# Patient Record
Sex: MALE | Race: WHITE | NOT HISPANIC OR LATINO | URBAN - METROPOLITAN AREA
[De-identification: names, ages, dates, MRNs, and addresses within clinical notes are randomized per-mention and may not be internally consistent; named-entity substitution may affect disease eponyms.]

---

## 2019-05-28 ENCOUNTER — OFFICE VISIT - HEALTHEAST (OUTPATIENT)
Dept: FAMILY MEDICINE | Facility: CLINIC | Age: 44
End: 2019-05-28

## 2019-05-28 ENCOUNTER — COMMUNICATION - HEALTHEAST (OUTPATIENT)
Dept: TELEHEALTH | Facility: CLINIC | Age: 44
End: 2019-05-28

## 2019-05-28 DIAGNOSIS — R03.0 ELEVATED BLOOD PRESSURE READING WITHOUT DIAGNOSIS OF HYPERTENSION: ICD-10-CM

## 2019-05-28 DIAGNOSIS — D23.9 DERMATOFIBROMA: ICD-10-CM

## 2019-05-28 DIAGNOSIS — Z00.00 ROUTINE GENERAL MEDICAL EXAMINATION AT A HEALTH CARE FACILITY: ICD-10-CM

## 2019-05-28 LAB
CHOLEST SERPL-MCNC: 242 MG/DL
FASTING STATUS PATIENT QL REPORTED: YES
FASTING STATUS PATIENT QL REPORTED: YES
GLUCOSE BLD-MCNC: 217 MG/DL (ref 70–125)
HDLC SERPL-MCNC: 42 MG/DL
LDLC SERPL CALC-MCNC: 133 MG/DL
TRIGL SERPL-MCNC: 337 MG/DL

## 2019-05-28 ASSESSMENT — MIFFLIN-ST. JEOR: SCORE: 2224.36

## 2019-05-30 ENCOUNTER — COMMUNICATION - HEALTHEAST (OUTPATIENT)
Dept: FAMILY MEDICINE | Facility: CLINIC | Age: 44
End: 2019-05-30

## 2019-06-14 ENCOUNTER — OFFICE VISIT - HEALTHEAST (OUTPATIENT)
Dept: FAMILY MEDICINE | Facility: CLINIC | Age: 44
End: 2019-06-14

## 2019-06-14 DIAGNOSIS — E78.5 DYSLIPIDEMIA: ICD-10-CM

## 2019-06-14 DIAGNOSIS — E11.9 TYPE 2 DIABETES MELLITUS WITHOUT COMPLICATION, WITHOUT LONG-TERM CURRENT USE OF INSULIN (H): ICD-10-CM

## 2019-06-14 DIAGNOSIS — R03.0 ELEVATED BLOOD PRESSURE READING WITHOUT DIAGNOSIS OF HYPERTENSION: ICD-10-CM

## 2019-06-14 LAB
FASTING STATUS PATIENT QL REPORTED: YES
GLUCOSE BLD-MCNC: 149 MG/DL (ref 70–125)
HBA1C MFR BLD: 10.3 % (ref 3.5–6)

## 2019-06-14 ASSESSMENT — MIFFLIN-ST. JEOR: SCORE: 2201.68

## 2019-07-16 ENCOUNTER — AMBULATORY - HEALTHEAST (OUTPATIENT)
Dept: EDUCATION SERVICES | Facility: CLINIC | Age: 44
End: 2019-07-16

## 2019-11-01 ENCOUNTER — COMMUNICATION - HEALTHEAST (OUTPATIENT)
Dept: FAMILY MEDICINE | Facility: CLINIC | Age: 44
End: 2019-11-01

## 2019-11-01 DIAGNOSIS — E11.9 TYPE 2 DIABETES MELLITUS WITHOUT COMPLICATION, WITHOUT LONG-TERM CURRENT USE OF INSULIN (H): ICD-10-CM

## 2019-11-15 ENCOUNTER — OFFICE VISIT - HEALTHEAST (OUTPATIENT)
Dept: FAMILY MEDICINE | Facility: CLINIC | Age: 44
End: 2019-11-15

## 2019-11-15 DIAGNOSIS — E11.9 TYPE 2 DIABETES MELLITUS WITHOUT COMPLICATION, WITHOUT LONG-TERM CURRENT USE OF INSULIN (H): ICD-10-CM

## 2019-11-15 LAB
ANION GAP SERPL CALCULATED.3IONS-SCNC: 10 MMOL/L (ref 5–18)
BUN SERPL-MCNC: 15 MG/DL (ref 8–22)
CALCIUM SERPL-MCNC: 9.7 MG/DL (ref 8.5–10.5)
CHLORIDE BLD-SCNC: 104 MMOL/L (ref 98–107)
CHOLEST SERPL-MCNC: 198 MG/DL
CO2 SERPL-SCNC: 26 MMOL/L (ref 22–31)
CREAT SERPL-MCNC: 1.11 MG/DL (ref 0.7–1.3)
FASTING STATUS PATIENT QL REPORTED: YES
GFR SERPL CREATININE-BSD FRML MDRD: >60 ML/MIN/1.73M2
GLUCOSE BLD-MCNC: 132 MG/DL (ref 70–125)
HBA1C MFR BLD: 6.6 % (ref 3.5–6)
HDLC SERPL-MCNC: 37 MG/DL
LDLC SERPL CALC-MCNC: 133 MG/DL
POTASSIUM BLD-SCNC: 4.5 MMOL/L (ref 3.5–5)
SODIUM SERPL-SCNC: 140 MMOL/L (ref 136–145)
TRIGL SERPL-MCNC: 139 MG/DL

## 2019-11-15 RX ORDER — GLUCOSAMINE HCL/CHONDROITIN SU 500-400 MG
CAPSULE ORAL
Qty: 100 STRIP | Refills: 3 | Status: SHIPPED | OUTPATIENT
Start: 2019-11-15

## 2019-11-17 ENCOUNTER — COMMUNICATION - HEALTHEAST (OUTPATIENT)
Dept: FAMILY MEDICINE | Facility: CLINIC | Age: 44
End: 2019-11-17

## 2020-04-11 ENCOUNTER — COMMUNICATION - HEALTHEAST (OUTPATIENT)
Dept: FAMILY MEDICINE | Facility: CLINIC | Age: 45
End: 2020-04-11

## 2020-04-11 DIAGNOSIS — E11.9 TYPE 2 DIABETES MELLITUS WITHOUT COMPLICATION, WITHOUT LONG-TERM CURRENT USE OF INSULIN (H): ICD-10-CM

## 2020-07-17 ENCOUNTER — COMMUNICATION - HEALTHEAST (OUTPATIENT)
Dept: FAMILY MEDICINE | Facility: CLINIC | Age: 45
End: 2020-07-17

## 2020-07-17 DIAGNOSIS — E11.9 TYPE 2 DIABETES MELLITUS WITHOUT COMPLICATION, WITHOUT LONG-TERM CURRENT USE OF INSULIN (H): ICD-10-CM

## 2020-10-05 ENCOUNTER — COMMUNICATION - HEALTHEAST (OUTPATIENT)
Dept: FAMILY MEDICINE | Facility: CLINIC | Age: 45
End: 2020-10-05

## 2020-10-05 DIAGNOSIS — E11.9 TYPE 2 DIABETES MELLITUS WITHOUT COMPLICATION, WITHOUT LONG-TERM CURRENT USE OF INSULIN (H): ICD-10-CM

## 2020-12-29 ENCOUNTER — COMMUNICATION - HEALTHEAST (OUTPATIENT)
Dept: FAMILY MEDICINE | Facility: CLINIC | Age: 45
End: 2020-12-29

## 2020-12-29 DIAGNOSIS — E11.9 TYPE 2 DIABETES MELLITUS WITHOUT COMPLICATION, WITHOUT LONG-TERM CURRENT USE OF INSULIN (H): ICD-10-CM

## 2021-02-05 ENCOUNTER — RECORDS - HEALTHEAST (OUTPATIENT)
Dept: ADMINISTRATIVE | Facility: OTHER | Age: 46
End: 2021-02-05

## 2021-02-05 LAB — RETINOPATHY: NEGATIVE

## 2021-02-11 ENCOUNTER — RECORDS - HEALTHEAST (OUTPATIENT)
Dept: HEALTH INFORMATION MANAGEMENT | Facility: CLINIC | Age: 46
End: 2021-02-11

## 2021-03-24 ENCOUNTER — COMMUNICATION - HEALTHEAST (OUTPATIENT)
Dept: FAMILY MEDICINE | Facility: CLINIC | Age: 46
End: 2021-03-24

## 2021-03-24 DIAGNOSIS — E11.9 TYPE 2 DIABETES MELLITUS WITHOUT COMPLICATION, WITHOUT LONG-TERM CURRENT USE OF INSULIN (H): ICD-10-CM

## 2021-03-31 ENCOUNTER — AMBULATORY - HEALTHEAST (OUTPATIENT)
Dept: NURSING | Facility: CLINIC | Age: 46
End: 2021-03-31

## 2021-04-21 ENCOUNTER — AMBULATORY - HEALTHEAST (OUTPATIENT)
Dept: NURSING | Facility: CLINIC | Age: 46
End: 2021-04-21

## 2021-05-01 ENCOUNTER — HEALTH MAINTENANCE LETTER (OUTPATIENT)
Age: 46
End: 2021-05-01

## 2021-05-29 NOTE — TELEPHONE ENCOUNTER
Reason contacted:  Results   Information relayed:  Below message. Appointment scheduled on Friday 6/14/2019 at 11:20 am with Dr. Del Rio.   Additional questions:  No  Further follow-up needed:  No  Okay to leave a detailed message:  No

## 2021-05-29 NOTE — TELEPHONE ENCOUNTER
----- Message from Jem Del Rio MD sent at 5/30/2019  2:43 PM CDT -----  Please call patient: Cholesterol numbers are mildly elevated.  Blood sugar was very high.  Blood sugar suggests possible diabetes.  Normal blood sugar should be less than 100 when fasting, blood sugar was 217.  At this time try to eliminate sugar from your diet.  Reduce carbohydrate portions including bread rice pasta and cereal.  Schedule a follow-up visit with me sooner than, 1 month originally discussed to recheck her blood pressure and to check blood sugar and decide on further steps.  I recommended appointment in 1 or 2 weeks.

## 2021-05-29 NOTE — PROGRESS NOTES
Patient ID: Henok Waldron is a 44 y.o. male.  /82   Pulse 65   Ht 6' (1.829 m)   Wt (!) 283 lb (128.4 kg)   SpO2 98%   BMI 38.38 kg/m      Assessment/Plan:                Diagnoses and all orders for this visit:    Type 2 diabetes mellitus without complication, without long-term current use of insulin (H)  -     Glycosylated Hemoglobin A1c  -     Glucose  -     metFORMIN (GLUCOPHAGE) 500 MG tablet; Take 0.5 tablets (250 mg total) by mouth 2 (two) times a day with meals.  Dispense: 45 tablet; Refill: 2  -     Ambulatory referral to Diabetes Education Program (CDE)    Elevated blood pressure reading without diagnosis of hypertension    Dyslipidemia      DISCUSSION  Initiate metformin.  See diabetic nurse educator.  Return to see me in the next 6 to 8 weeks to reassess and decide on further course of action.  Anticipate further testing such as urine microalbumin, eye examination and foot exam at that visit.  We will also give consideration to starting statin therapy pending his tolerance of the Metformin which we will start at this time.  Will start low-dose metformin and consider adjustment  Subjective:     HPI    Henok Waldron is a 44 y.o. male is here today to follow-up on abnormal blood sugar testing obtained at his physical exam in May 28.  Weight and blood pressure were brought up his concerns at his physical.  We discussed an approach to monitoring his blood pressure into beginning a diet improvement and exercise as a means to help reduce weight and improve overall health.  Unfortunately he had a fasting blood sugar of 217.  Patient was contacted and the results were explained.  Patient was given advice regarding further significant dietary change, namely elimination of sugar and reduction of carbohydrates.  Patient has done some research on his own and had made great changes.  His weight is noted to be down 5 pounds.  He reports overall feeling much better.  On his arrival today fasting blood  sugars obtained of 149.  Discussed that this represents an improvement compared to the 217 but is still not an ideal fasting blood sugar.  We also obtained an A1c today which is 10.3.  I discussed with him extensively that his numbers do substantiate the diagnosis of type 2 diabetes.  Based on all factors I do feel it is imperative that we start him on medication therapy in addition to continuing the dietary changes which he is started.  I also discussed with him the continued importance of close monitoring of blood pressure.  His blood pressure does remain elevated above the ideal range.  We will likely need to consider addition of antihypertensive therapy.  His cholesterol results were reasonable from an average standpoint but because of his diabetes diagnosis it is a more significant consideration and will likely warrant the use of statin medication at some point.  We discussed diabetes in basic terms and the importance of control to prevent complications.  The basics of complications are discussed today.  We discussed that we would get him set up to see a diabetic nurse educator to further his knowledge and to allow him to begin checking his blood sugars.  We discussed specifically initiating metformin.    Review of Systems  Complete review of systems is obtained.  Other than the specific considerations noted above complete review of systems is negative.      Objective:   Medications:  Current Outpatient Medications   Medication Sig     metFORMIN (GLUCOPHAGE) 500 MG tablet Take 0.5 tablets (250 mg total) by mouth 2 (two) times a day with meals.     Allergies:  No Known Allergies    Tobacco:   reports that he has never smoked. He quit smokeless tobacco use about 5 months ago.     Physical Exam      /82   Pulse 65   Ht 6' (1.829 m)   Wt (!) 283 lb (128.4 kg)   SpO2 98%   BMI 38.38 kg/m          General Appearance:    Alert, cooperative, no distress

## 2021-05-29 NOTE — PROGRESS NOTES
Patient ID: Henok Waldron is a 44 y.o. male.  /88   Pulse 87   Ht 6' (1.829 m)   Wt (!) 288 lb (130.6 kg)   SpO2 97%   BMI 39.06 kg/m      Assessment/Plan:                   Diagnoses and all orders for this visit:    Routine general medical examination at a health care facility  -     Lipid [facility] FASTING  -     Glucose    Dermatofibroma  -     triamcinolone (KENALOG) 0.1 % cream; Apply topically 2 (two) times a day for 10 days.  Dispense: 30 g; Refill: 0    BMI 39.0-39.9,adult    Elevated blood pressure reading without diagnosis of hypertension    Other orders  -     Tdap vaccine,  6yo or older,  IM  -     Cancel: Td, Preservative Free (green label)      DISCUSSION  The soft tissue lump is a consistency of a dermatofibroma, there is some vascularity bringing to question as to whether this may be some sort of hemangioma.  In any regard think it is benign.  Since it is that she will try short course of triamcinolone low potency to see if this helps shrink the lesion and/or treat the itching.    Lifestyle modification to improve blood pressure and reduce weight discussed extensively.  Follow-up in 1 month to reassess blood pressure.  Patient does admit to snoring but does not have out right somnolence or witnessed apnea to warrant any specific referral at this point but will reassess.    Obtain labs as ordered above.  Update tetanus shot.  Subjective:     HPI    Henok Waldron is a 44 y.o. male who is here as a new patient.  His primary concern is that he has a lump on his arm that arose about a month ago.  He thinks it may have been an insect bite but has since been a persistent lump that is slightly purplish in color slightly raised and sometimes itchy.  He otherwise overall feels well.  He has not had health care visit in approximately 15 years and inquires about routine health prevention measures.    Blood pressure is noted to be elevated.  It improved slightly upon recheck.  Possible underlying  hypertension however based on one isolated timeframe with elevated reading feel that we need more monitoring in order to establish diagnosis of hypertension and consider treatment.  Discussed lifestyle modification as the initial step along with arranging for short-term follow-up to reassess blood pressure.    Body mass index is elevated at 39.  Reviewed extensively nutrition and exercise as a means to lower his weight and reduce the risk of health complications.  Discussed screening for cholesterol and blood sugar as he is fasting today.  No indication for colon cancer prostate cancer screening based on family history.    Review of Systems  Complete review of systems is obtained.  Other than the specific considerations noted above complete review of systems is negative.          Objective:   Medications:  Current Outpatient Medications   Medication Sig     triamcinolone (KENALOG) 0.1 % cream Apply topically 2 (two) times a day for 10 days.       Allergies:  No Known Allergies    Tobacco:   reports that he has never smoked. He quit smokeless tobacco use about 4 months ago.     Physical Exam          /88   Pulse 87   Ht 6' (1.829 m)   Wt (!) 288 lb (130.6 kg)   SpO2 97%   BMI 39.06 kg/m          General Appearance:    Alert, cooperative, no distress   Eyes:   No scleral icterus or conjunctival irritation       Ears:    Normal TM's and external ear canals, both ears   Throat:   Lips, mucosa, and tongue normal; teeth and gums normal   Neck:   Supple, symmetrical, trachea midline, no adenopathy;        thyroid:  No enlargement/tenderness/nodules   Lungs:     Clear to auscultation bilaterally, respirations unlabored, no wheezes or crackles   Heart:    Regular rate and rhythm,  No murmur   Extremities:  No edema, no joint swelling or redness, no evidence of any injuries   Skin:  In the left upper inner arm there is a small raised papule measuring about 0.75 mm in greatest diameter, it is skin colored with a  slight purplish hue suggestive of underlying vascularity, has a somewhat firm and feels suggestive of a dermatofibroma.  This is clearly within the skin itself.  It is nontender to the touch.   Neurologic:  On gross examination there is no motor or sensory deficit.  Patient walks with a normal gait

## 2021-05-30 NOTE — PROGRESS NOTES
First in 15 years  Wife made come  Spot on arm  Started metformin  Some issues early on  Better now  1/2 tablet two times a day  Morning before work  Evening after dinner  Getting easier to remember  Not checking yet  OneTouch    Lots of vegetables and chicken  Had a hamburger the other day  Potatoes, rice, white bread, pasta    B-skips most of the time-mixed fruit from work, melon and berries, egg burrito  L-bring salads, out-chipolte bowl, tacos with black beans  D-BLT, salads with egg or chicken, roasted veggies  S-protein bars, popcorn, low carb rachel seed chips, peppers no dip  More salty, not sweet    Walking about 2 miles a day most days

## 2021-06-02 NOTE — TELEPHONE ENCOUNTER
RN cannot approve Refill Request    RN can NOT refill this medication Protocol failed and NO refill given. Last office visit: 6/14/2019 Jem Del Rio MD Last Physical: Visit date not found Last MTM visit: Visit date not found Last visit same specialty: 6/14/2019 Jem Del Rio MD.  Next visit within 3 mo: Visit date not found  Next physical within 3 mo: Visit date not found      Constance Grossman, [facility] Triage/Med Refill 11/1/2019    Requested Prescriptions   Pending Prescriptions Disp Refills     metFORMIN (GLUCOPHAGE) 500 MG tablet [Pharmacy Med Name: METFORMIN 500MG TAB] 90 tablet 1     Sig: TAKE 1/2 (ONE-HALF) TABLET BY MOUTH TWICE DAILY WITH MEALS       Metformin Refill Protocol Failed - 11/1/2019  3:05 PM        Failed - LFT or AST or ALT in last 12 months     No results found for: ALBUMIN, BILITOT, BILIDIR, ALKPHOS, AST, ALT, PROT             Failed - GFR or Serum Creatinine in last 6 months     No results found for: GFRNONAA  No results found for: GFRAA          Failed - Microalbumin in last year      No results found for: MICROALBUR               Passed - Blood pressure in last 12 months     BP Readings from Last 1 Encounters:   06/14/19 140/82             Passed - Visit with PCP or prescribing provider visit in last 6 months or next 3 months     Last office visit with prescriber/PCP: 6/14/2019 OR same dept: 6/14/2019 Jem Del Rio MD OR same specialty: 6/14/2019 Jem Del Rio MD Last physical: Visit date not found Last MTM visit: Visit date not found         Next appt within 3 mo: Visit date not found  Next physical within 3 mo: Visit date not found  Prescriber OR PCP: Jem Del Rio MD  Last diagnosis associated with med order: 1. Type 2 diabetes mellitus without complication, without long-term current use of insulin (H)  - metFORMIN (GLUCOPHAGE) 500 MG tablet [Pharmacy Med Name: METFORMIN 500MG TAB]; TAKE 1/2 (ONE-HALF) TABLET BY MOUTH TWICE DAILY WITH MEALS  Dispense: 90  tablet; Refill: 1     If protocol passes may refill for 12 months if within 3 months of last provider visit (or a total of 15 months).           Passed - A1C in last 6 months     Hemoglobin A1c   Date Value Ref Range Status   06/14/2019 10.3 (H) 3.5 - 6.0 % Final

## 2021-06-03 VITALS
DIASTOLIC BLOOD PRESSURE: 72 MMHG | BODY MASS INDEX: 34.86 KG/M2 | WEIGHT: 257 LBS | HEART RATE: 72 BPM | SYSTOLIC BLOOD PRESSURE: 122 MMHG | OXYGEN SATURATION: 98 %

## 2021-06-03 VITALS — WEIGHT: 288 LBS | HEIGHT: 72 IN | BODY MASS INDEX: 39.01 KG/M2

## 2021-06-03 VITALS — BODY MASS INDEX: 38.33 KG/M2 | HEIGHT: 72 IN | WEIGHT: 283 LBS

## 2021-06-03 VITALS — WEIGHT: 278.2 LBS | BODY MASS INDEX: 37.73 KG/M2

## 2021-06-03 NOTE — PROGRESS NOTES
Patient ID: Henok Waldron is a 44 y.o. male.  /72   Pulse 72   Wt (!) 257 lb (116.6 kg)   SpO2 98%   BMI 34.86 kg/m      Assessment/Plan:                Diagnoses and all orders for this visit:    Type 2 diabetes mellitus without complication, without long-term current use of insulin (H)  -     Glycosylated Hemoglobin A1c  -     blood glucose test strips; Test blood sugar once daily and as needed. Dispense brand per patient's insurance at pharmacy discretion. Diagnosis Type 2 DM controlled  Dispense: 100 strip; Refill: 3  -     Lipid [facility] FASTING  -     Basic Metabolic Panel      DISCUSSION  A1c is improved to 6.6.  Recommend continued efforts to reduce weight further, continued metformin.  Check additional labs as noted above.  Plan follow-up with me in 6 months.  Pending additional lab tests will decide if any additional action needs to be taken at this time.  Patient will schedule an eye exam.  Subjective:     HPI  Henok Waldron is a 44 y.o. male he is here today to follow-up on type 2 diabetes.  He was diagnosed this past summer after having a fasting blood sugar over 200.  His A1c at the time of diagnosis was 10.3 in June 2019.  He was initiated on metformin.  He has begun checking blood sugars.  He has attended diabetic education.  Patient has made dietary changes.  Weight is down nearly 30 pounds compared to his initial presentation.  Blood sugar results are reviewed, morning fasting blood sugars range from  but most readings are less than 130 consistently.  He denies any hypoglycemia.  Today we discussed the importance of vascular disease risk factor reduction.  We also discussed monitoring parameters including routine eye exams and checking of kidneys.  We discussed benefits of statin therapy and aspirin therapy.  We elected to hold off on initiation of any new medications for the time being.  Discussed the importance of continuing his metformin.    Lab Results   Component Value Date     HGBA1C 6.6 (H) 11/15/2019     Wt Readings from Last 3 Encounters:   11/15/19 (!) 257 lb (116.6 kg)   07/16/19 (!) 278 lb 3.2 oz (126.2 kg)   06/14/19 (!) 283 lb (128.4 kg)     Blood Pressure:   BP Readings from Last 3 Encounters:   11/15/19 122/72   06/14/19 140/82   05/28/19 150/88     Lab Results   Component Value Date    CHOL 242 (H) 05/28/2019     Lab Results   Component Value Date    HDL 42 05/28/2019     Lab Results   Component Value Date    LDLCALC 133 (H) 05/28/2019     Lab Results   Component Value Date    TRIG 337 (H) 05/28/2019     No components found for: CHOLHDL    Review of Systems  Complete review of systems is obtained.  Other than the specific considerations noted above complete review of systems is negative.    Objective:   Medications:  Current Outpatient Medications   Medication Sig     metFORMIN (GLUCOPHAGE) 500 MG tablet TAKE 1/2 (ONE-HALF) TABLET BY MOUTH TWICE DAILY WITH MEALS     blood glucose test strips Test blood sugar once daily and as needed. Dispense brand per patient's insurance at pharmacy discretion. Diagnosis Type 2 DM controlled     Allergies:  Allergies   Allergen Reactions     Penicillins      Tobacco:   reports that he has never smoked. He quit smokeless tobacco use about 10 months ago.     Physical Exam      /72   Pulse 72   Wt (!) 257 lb (116.6 kg)   SpO2 98%   BMI 34.86 kg/m      General Appearance:    Alert, cooperative, no distress   Eyes:   No scleral icterus or conjunctival irritation       Lungs:     Clear to auscultation bilaterally, respirations unlabored, no wheezes or crackles   Heart:    Regular rate and rhythm,  No murmur   Extremities:  No edema, no joint swelling or redness, no evidence of any injuries   Skin:  No concerning skin findings, no suspicious moles, no rashes   Neurologic:  On gross examination there is no motor or sensory deficit.  Patient walks with a normal gait

## 2021-06-07 NOTE — TELEPHONE ENCOUNTER
RN cannot approve Refill Request    RN can NOT refill this medication PCP messaged that patient is overdue for Labs. Last office visit: 11/15/2019 Jem Del Rio MD Last Physical: Visit date not found Last MTM visit: Visit date not found Last visit same specialty: 11/15/2019 Jem Del Rio MD.  Next visit within 3 mo: Visit date not found  Next physical within 3 mo: Visit date not found      Josselin Srinivasan, [facility] Triage/Med Refill 4/12/2020    Requested Prescriptions   Pending Prescriptions Disp Refills     metFORMIN (GLUCOPHAGE) 500 MG tablet [Pharmacy Med Name: metFORMIN HCl 500 MG Oral Tablet] 90 tablet 0     Sig: TAKE 1/2 (ONE-HALF) TABLET BY MOUTH TWICE DAILY WITH MEALS       Metformin Refill Protocol Failed - 4/11/2020  9:46 AM        Failed - LFT or AST or ALT in last 12 months     No results found for: ALBUMIN, BILITOT, BILIDIR, ALKPHOS, AST, ALT, PROT             Failed - Microalbumin in last year      No results found for: MICROALBUR               Passed - Blood pressure in last 12 months     BP Readings from Last 1 Encounters:   11/15/19 122/72             Passed - GFR or Serum Creatinine in last 6 months     GFR MDRD Non Af Amer   Date Value Ref Range Status   11/15/2019 >60 >60 mL/min/1.73m2 Final     GFR MDRD Af Amer   Date Value Ref Range Status   11/15/2019 >60 >60 mL/min/1.73m2 Final             Passed - Visit with PCP or prescribing provider visit in last 6 months or next 3 months     Last office visit with prescriber/PCP: 11/15/2019 OR same dept: 11/15/2019 Jem Del Rio MD OR same specialty: 11/15/2019 Jem Del Rio MD Last physical: Visit date not found Last MTM visit: Visit date not found         Next appt within 3 mo: Visit date not found  Next physical within 3 mo: Visit date not found  Prescriber OR PCP: Jem Del Rio MD  Last diagnosis associated with med order: 1. Type 2 diabetes mellitus without complication, without long-term current use of insulin  (H)  - metFORMIN (GLUCOPHAGE) 500 MG tablet [Pharmacy Med Name: metFORMIN HCl 500 MG Oral Tablet]; TAKE 1/2 (ONE-HALF) TABLET BY MOUTH TWICE DAILY WITH MEALS  Dispense: 90 tablet; Refill: 0     If protocol passes may refill for 12 months if within 3 months of last provider visit (or a total of 15 months).           Passed - A1C in last 6 months     Hemoglobin A1c   Date Value Ref Range Status   11/15/2019 6.6 (H) 3.5 - 6.0 % Final

## 2021-06-12 NOTE — TELEPHONE ENCOUNTER
RN cannot approve Refill Request    RN can NOT refill this medication Protocol failed and NO refill given. Last office visit: 11/15/2019 Jem Del Rio MD Last Physical: Visit date not found Last MTM visit: Visit date not found Last visit same specialty: 11/15/2019 Jem Del Rio MD.  Next visit within 3 mo: Visit date not found  Next physical within 3 mo: Visit date not found      Vale Sesay, [facility] Triage/Med Refill 10/7/2020    Requested Prescriptions   Pending Prescriptions Disp Refills     metFORMIN (GLUCOPHAGE) 500 MG tablet [Pharmacy Med Name: metFORMIN HCl 500 MG Oral Tablet] 90 tablet 0     Sig: TAKE 1/2 (ONE-HALF) TABLET BY MOUTH TWICE DAILY WITH MEALS       Metformin Refill Protocol Failed - 10/5/2020 12:21 PM        Failed - LFT or AST or ALT in last 12 months     No results found for: ALBUMIN, BILITOT, BILIDIR, ALKPHOS, AST, ALT, PROT             Failed - Visit with PCP or prescribing provider visit in last 6 months or next 3 months     Last office visit with prescriber/PCP: Visit date not found OR same dept: 11/15/2019 Jem Del Rio MD OR same specialty: 11/15/2019 Jem Del Rio MD Last physical: Visit date not found Last MTM visit: Visit date not found         Next appt within 3 mo: Visit date not found  Next physical within 3 mo: Visit date not found  Prescriber OR PCP: Jem Del Rio MD  Last diagnosis associated with med order: 1. Type 2 diabetes mellitus without complication, without long-term current use of insulin (H)  - metFORMIN (GLUCOPHAGE) 500 MG tablet [Pharmacy Med Name: metFORMIN HCl 500 MG Oral Tablet]; TAKE 1/2 (ONE-HALF) TABLET BY MOUTH TWICE DAILY WITH MEALS  Dispense: 90 tablet; Refill: 0     If protocol passes may refill for 12 months if within 3 months of last provider visit (or a total of 15 months).           Failed - A1C in last 6 months     Hemoglobin A1c   Date Value Ref Range Status   11/15/2019 6.6 (H) 3.5 - 6.0 % Final               Failed -  Microalbumin in last year      No results found for: MICROALBUR               Passed - Blood pressure in last 12 months     BP Readings from Last 1 Encounters:   11/15/19 122/72             Passed - GFR or Serum Creatinine in last 6 months     GFR MDRD Non Af Amer   Date Value Ref Range Status   11/15/2019 >60 >60 mL/min/1.73m2 Final     GFR MDRD Af Amer   Date Value Ref Range Status   11/15/2019 >60 >60 mL/min/1.73m2 Final

## 2021-06-14 ENCOUNTER — COMMUNICATION - HEALTHEAST (OUTPATIENT)
Dept: FAMILY MEDICINE | Facility: CLINIC | Age: 46
End: 2021-06-14

## 2021-06-14 DIAGNOSIS — E11.9 TYPE 2 DIABETES MELLITUS WITHOUT COMPLICATION, WITHOUT LONG-TERM CURRENT USE OF INSULIN (H): ICD-10-CM

## 2021-06-14 NOTE — TELEPHONE ENCOUNTER
RN cannot approve Refill Request    RN can NOT refill this medication PCP messaged that patient is overdue for Labs. Last office visit: 11/15/2019 Jem Del Rio MD Last Physical: Visit date not found Last MTM visit: Visit date not found Last visit same specialty: 11/15/2019 Jem Del Rio MD.  Next visit within 3 mo: Visit date not found  Next physical within 3 mo: Visit date not found      Josselin Srinivasan, [facility] Triage/Med Refill 12/31/2020    Requested Prescriptions   Pending Prescriptions Disp Refills     metFORMIN (GLUCOPHAGE) 500 MG tablet [Pharmacy Med Name: metFORMIN HCl 500 MG Oral Tablet] 90 tablet 0     Sig: TAKE 1/2 (ONE-HALF) TABLET BY MOUTH TWICE DAILY WITH MEALS       Metformin Refill Protocol Failed - 12/29/2020  6:44 PM        Failed - Blood pressure in last 12 months     BP Readings from Last 1 Encounters:   11/15/19 122/72             Failed - LFT or AST or ALT in last 12 months     No results found for: ALBUMIN, BILITOT, BILIDIR, ALKPHOS, AST, ALT, PROT             Failed - GFR or Serum Creatinine in last 6 months     GFR MDRD Non Af Amer   Date Value Ref Range Status   11/15/2019 >60 >60 mL/min/1.73m2 Final     GFR MDRD Af Amer   Date Value Ref Range Status   11/15/2019 >60 >60 mL/min/1.73m2 Final             Failed - Visit with PCP or prescribing provider visit in last 6 months or next 3 months     Last office visit with prescriber/PCP: Visit date not found OR same dept: Visit date not found OR same specialty: 11/15/2019 Jem Del Rio MD Last physical: Visit date not found Last MTM visit: Visit date not found         Next appt within 3 mo: Visit date not found  Next physical within 3 mo: Visit date not found  Prescriber OR PCP: Jem Del Rio MD  Last diagnosis associated with med order: 1. Type 2 diabetes mellitus without complication, without long-term current use of insulin (H)  - metFORMIN (GLUCOPHAGE) 500 MG tablet [Pharmacy Med Name: metFORMIN HCl 500 MG Oral  Tablet]; TAKE 1/2 (ONE-HALF) TABLET BY MOUTH TWICE DAILY WITH MEALS  Dispense: 90 tablet; Refill: 0     If protocol passes may refill for 12 months if within 3 months of last provider visit (or a total of 15 months).           Failed - A1C in last 6 months     Hemoglobin A1c   Date Value Ref Range Status   11/15/2019 6.6 (H) 3.5 - 6.0 % Final               Failed - Microalbumin in last year      No results found for: MICROALBUR

## 2021-06-16 NOTE — TELEPHONE ENCOUNTER
RN cannot approve Refill Request    RN can NOT refill this medication PCP messaged that patient is overdue for Labs. Last office visit: 11/15/2019 Jem Del Rio MD Last Physical: Visit date not found Last MTM visit: Visit date not found Last visit same specialty: 11/15/2019 Jem Del Rio MD.  Next visit within 3 mo: Visit date not found  Next physical within 3 mo: Visit date not found      Josselin Srinivasan, [facility] Triage/Med Refill 3/24/2021    Requested Prescriptions   Pending Prescriptions Disp Refills     metFORMIN (GLUCOPHAGE) 500 MG tablet [Pharmacy Med Name: metFORMIN HCl 500 MG Oral Tablet] 90 tablet 0     Sig: TAKE 1/2 (ONE-HALF) TABLET BY MOUTH TWICE DAILY WITH MEALS       Metformin Refill Protocol Failed - 3/24/2021  7:12 PM        Failed - Blood pressure in last 12 months     BP Readings from Last 1 Encounters:   11/15/19 122/72             Failed - LFT or AST or ALT in last 12 months     No results found for: ALBUMIN, BILITOT, BILIDIR, ALKPHOS, AST, ALT, PROT             Failed - GFR or Serum Creatinine in last 6 months     GFR MDRD Non Af Amer   Date Value Ref Range Status   11/15/2019 >60 >60 mL/min/1.73m2 Final     GFR MDRD Af Amer   Date Value Ref Range Status   11/15/2019 >60 >60 mL/min/1.73m2 Final             Failed - Visit with PCP or prescribing provider visit in last 6 months or next 3 months     Last office visit with prescriber/PCP: Visit date not found OR same dept: Visit date not found OR same specialty: 11/15/2019 Jem Del Rio MD Last physical: Visit date not found Last MTM visit: Visit date not found         Next appt within 3 mo: Visit date not found  Next physical within 3 mo: Visit date not found  Prescriber OR PCP: Jem Del Rio MD  Last diagnosis associated with med order: 1. Type 2 diabetes mellitus without complication, without long-term current use of insulin (H)  - metFORMIN (GLUCOPHAGE) 500 MG tablet [Pharmacy Med Name: metFORMIN HCl 500 MG Oral  Tablet]; TAKE 1/2 (ONE-HALF) TABLET BY MOUTH TWICE DAILY WITH MEALS  Dispense: 90 tablet; Refill: 0     If protocol passes may refill for 12 months if within 3 months of last provider visit (or a total of 15 months).           Failed - A1C in last 6 months     Hemoglobin A1c   Date Value Ref Range Status   11/15/2019 6.6 (H) 3.5 - 6.0 % Final               Failed - Microalbumin in last year      No results found for: MICROALBUR

## 2021-06-19 NOTE — LETTER
Letter by Jem Del Rio MD at      Author: Jem Del Rio MD Service: -- Author Type: --    Filed:  Encounter Date: 11/17/2019 Status: Signed         Henok Waldron  [address] [place] [id number]             November 17, 2019         Dear Mr. Waldron,    Below are the results from your recent visit:    Resulted Orders   Glycosylated Hemoglobin A1c   Result Value Ref Range    Hemoglobin A1c 6.6 (H) 3.5 - 6.0 %    Narrative    Delta flag, repeated and both results are 6.6. Notified Dr. Del Rio and verified.   Lipid Cascade FASTING   Result Value Ref Range    Cholesterol 198 <=199 mg/dL    Triglycerides 139 <=149 mg/dL    HDL Cholesterol 37 (L) >=40 mg/dL    LDL Calculated 133 (H) <=129 mg/dL    Patient Fasting > 8hrs? Yes    Basic Metabolic Panel   Result Value Ref Range    Sodium 140 136 - 145 mmol/L    Potassium 4.5 3.5 - 5.0 mmol/L    Chloride 104 98 - 107 mmol/L    CO2 26 22 - 31 mmol/L    Anion Gap, Calculation 10 5 - 18 mmol/L    Glucose 132 (H) 70 - 125 mg/dL    Calcium 9.7 8.5 - 10.5 mg/dL    BUN 15 8 - 22 mg/dL    Creatinine 1.11 0.70 - 1.30 mg/dL    GFR MDRD Af Amer >60 >60 mL/min/1.73m2    GFR MDRD Non Af Amer >60 >60 mL/min/1.73m2    Narrative    Fasting Glucose reference range is 70-99 mg/dL per  American Diabetes Association (ADA) guidelines.       Great work getting the blood sugar down.  The electro lites and kidney function are normal.  The blood sugar at the time of your test was 132.  The cholesterol is mildly elevated, the numbers are consistent with previous numbers obtained.  At this point we will not initiate any cholesterol-lowering medication.  At your next visit we will discuss further.  Keep up the good work see you in about 6 months.    Please call with questions or contact us using [facility].    Sincerely,        Electronically signed by Jem Del Rio MD

## 2021-06-25 NOTE — TELEPHONE ENCOUNTER
RN cannot approve Refill Request    RN can NOT refill this medication PCP messaged that patient is overdue for Labs and Office Visit. Last office visit: 11/15/2019 Jem Del Rio MD Last Physical: Visit date not found Last MTM visit: Visit date not found Last visit same specialty: 11/15/2019 Jem Del Rio MD.  Next visit within 3 mo: Visit date not found  Next physical within 3 mo: Visit date not found      Katie Hopper, [facility] Triage/Med Refill 6/14/2021    Requested Prescriptions   Pending Prescriptions Disp Refills     metFORMIN (GLUCOPHAGE) 500 MG tablet [Pharmacy Med Name: metFORMIN HCl 500 MG Oral Tablet] 90 tablet 0     Sig: TAKE 1/2 (ONE-HALF) TABLET BY MOUTH TWICE DAILY WITH MEALS       Metformin Refill Protocol Failed - 6/14/2021  9:04 AM        Failed - Blood pressure in last 12 months     BP Readings from Last 1 Encounters:   11/15/19 122/72             Failed - LFT or AST or ALT in last 12 months     No results found for: ALBUMIN, BILITOT, BILIDIR, ALKPHOS, AST, ALT, PROT             Failed - GFR or Serum Creatinine in last 6 months     GFR MDRD Non Af Amer   Date Value Ref Range Status   11/15/2019 >60 >60 mL/min/1.73m2 Final     GFR MDRD Af Amer   Date Value Ref Range Status   11/15/2019 >60 >60 mL/min/1.73m2 Final             Failed - Visit with PCP or prescribing provider visit in last 6 months or next 3 months     Last office visit with prescriber/PCP: Visit date not found OR same dept: Visit date not found OR same specialty: 11/15/2019 Jem Del Rio MD Last physical: Visit date not found Last MTM visit: Visit date not found         Next appt within 3 mo: Visit date not found  Next physical within 3 mo: Visit date not found  Prescriber OR PCP: Jem Del Rio MD  Last diagnosis associated with med order: 1. Type 2 diabetes mellitus without complication, without long-term current use of insulin (H)  - metFORMIN (GLUCOPHAGE) 500 MG tablet [Pharmacy Med Name: metFORMIN  HCl 500 MG Oral Tablet]; TAKE 1/2 (ONE-HALF) TABLET BY MOUTH TWICE DAILY WITH MEALS  Dispense: 90 tablet; Refill: 0     If protocol passes may refill for 12 months if within 3 months of last provider visit (or a total of 15 months).           Failed - A1C in last 6 months     Hemoglobin A1c   Date Value Ref Range Status   11/15/2019 6.6 (H) 3.5 - 6.0 % Final               Failed - Microalbumin in last year      No results found for: MICROALBUR

## 2021-06-26 ENCOUNTER — HEALTH MAINTENANCE LETTER (OUTPATIENT)
Age: 46
End: 2021-06-26

## 2021-08-30 DIAGNOSIS — E11.9 TYPE 2 DIABETES MELLITUS WITHOUT COMPLICATION, WITHOUT LONG-TERM CURRENT USE OF INSULIN (H): ICD-10-CM

## 2021-08-30 NOTE — TELEPHONE ENCOUNTER
"Routing refill request to provider for review/approval because:  Labs out of range:  A1C  Labs not current:  CR, GFR, A1C  Patient last seen for office visit >1 year ago.     Last Written Prescription Date:  6/14/21  Last Fill Quantity: 90,  # refills: 0   Last office visit provider:   11/15/19    Requested Prescriptions   Pending Prescriptions Disp Refills     metFORMIN (GLUCOPHAGE) 500 MG tablet [Pharmacy Med Name: metFORMIN HCl 500 MG Oral Tablet] 90 tablet 0     Sig: TAKE 1/2 (ONE-HALF) TABLET BY MOUTH TWICE DAILY WITH MEALS       Biguanide Agents Failed - 8/30/2021  6:58 AM        Failed - Patient has documented A1c within the specified period of time.     If HgbA1C is 8 or greater, it needs to be on file within the past 3 months.  If less than 8, must be on file within the past 6 months.     Recent Labs   Lab Test 11/15/19  0938   A1C 6.6*             Failed - Patient's CR is NOT>1.4 OR Patient's EGFR is NOT<45 within past 12 mos.     Recent Labs   Lab Test 11/15/19  1030   GFRESTIMATED >60   GFRESTBLACK >60       Recent Labs   Lab Test 11/15/19  1030   CR 1.11             Failed - Recent (6 mo) or future (30 days) visit within the authorizing provider's specialty     Patient had office visit in the last 6 months or has a visit in the next 30 days with authorizing provider or within the authorizing provider's specialty.  See \"Patient Info\" tab in inbasket, or \"Choose Columns\" in Meds & Orders section of the refill encounter.            Passed - Patient is age 10 or older        Passed - Patient does NOT have a diagnosis of CHF.        Passed - Medication is active on med list             Katie Hopper RN 08/30/21 11:38 AM  "

## 2021-10-16 ENCOUNTER — HEALTH MAINTENANCE LETTER (OUTPATIENT)
Age: 46
End: 2021-10-16

## 2021-10-26 ENCOUNTER — OFFICE VISIT (OUTPATIENT)
Dept: FAMILY MEDICINE | Facility: CLINIC | Age: 46
End: 2021-10-26
Payer: COMMERCIAL

## 2021-10-26 VITALS
WEIGHT: 272 LBS | BODY MASS INDEX: 36.84 KG/M2 | OXYGEN SATURATION: 99 % | DIASTOLIC BLOOD PRESSURE: 76 MMHG | HEART RATE: 102 BPM | SYSTOLIC BLOOD PRESSURE: 128 MMHG | HEIGHT: 72 IN

## 2021-10-26 DIAGNOSIS — E78.5 DYSLIPIDEMIA: ICD-10-CM

## 2021-10-26 DIAGNOSIS — E11.9 TYPE 2 DIABETES MELLITUS WITHOUT COMPLICATION, WITHOUT LONG-TERM CURRENT USE OF INSULIN (H): ICD-10-CM

## 2021-10-26 DIAGNOSIS — Z00.00 ROUTINE HISTORY AND PHYSICAL EXAMINATION OF ADULT: Primary | ICD-10-CM

## 2021-10-26 LAB
ANION GAP SERPL CALCULATED.3IONS-SCNC: 12 MMOL/L (ref 5–18)
BUN SERPL-MCNC: 12 MG/DL (ref 8–22)
CALCIUM SERPL-MCNC: 10.1 MG/DL (ref 8.5–10.5)
CHLORIDE BLD-SCNC: 104 MMOL/L (ref 98–107)
CHOLEST SERPL-MCNC: 217 MG/DL
CO2 SERPL-SCNC: 24 MMOL/L (ref 22–31)
CREAT SERPL-MCNC: 1.26 MG/DL (ref 0.7–1.3)
FASTING STATUS PATIENT QL REPORTED: YES
GFR SERPL CREATININE-BSD FRML MDRD: 68 ML/MIN/1.73M2
GLUCOSE BLD-MCNC: 161 MG/DL (ref 70–125)
HBA1C MFR BLD: 6.9 % (ref 0–5.6)
HCV AB SERPL QL IA: NEGATIVE
HDLC SERPL-MCNC: 39 MG/DL
HIV 1+2 AB+HIV1 P24 AG SERPL QL IA: NEGATIVE
LDLC SERPL CALC-MCNC: 137 MG/DL
POTASSIUM BLD-SCNC: 4.8 MMOL/L (ref 3.5–5)
SODIUM SERPL-SCNC: 140 MMOL/L (ref 136–145)
TRIGL SERPL-MCNC: 206 MG/DL

## 2021-10-26 PROCEDURE — 87389 HIV-1 AG W/HIV-1&-2 AB AG IA: CPT | Performed by: FAMILY MEDICINE

## 2021-10-26 PROCEDURE — 83036 HEMOGLOBIN GLYCOSYLATED A1C: CPT | Performed by: FAMILY MEDICINE

## 2021-10-26 PROCEDURE — 80048 BASIC METABOLIC PNL TOTAL CA: CPT | Performed by: FAMILY MEDICINE

## 2021-10-26 PROCEDURE — 86803 HEPATITIS C AB TEST: CPT | Performed by: FAMILY MEDICINE

## 2021-10-26 PROCEDURE — 80061 LIPID PANEL: CPT | Performed by: FAMILY MEDICINE

## 2021-10-26 PROCEDURE — 99396 PREV VISIT EST AGE 40-64: CPT | Performed by: FAMILY MEDICINE

## 2021-10-26 PROCEDURE — 36415 COLL VENOUS BLD VENIPUNCTURE: CPT | Performed by: FAMILY MEDICINE

## 2021-10-26 PROCEDURE — 99213 OFFICE O/P EST LOW 20 MIN: CPT | Mod: 25 | Performed by: FAMILY MEDICINE

## 2021-10-26 RX ORDER — ASCORBIC ACID 100 MG
TABLET,CHEWABLE ORAL
COMMUNITY

## 2021-10-26 RX ORDER — ATORVASTATIN CALCIUM 20 MG/1
20 TABLET, FILM COATED ORAL DAILY
Qty: 30 TABLET | Refills: 11 | Status: SHIPPED | OUTPATIENT
Start: 2021-10-26 | End: 2022-12-31

## 2021-10-26 RX ORDER — MULTIVIT-MIN/IRON/FOLIC ACID/K 18-600-40
CAPSULE ORAL
COMMUNITY

## 2021-10-26 ASSESSMENT — MIFFLIN-ST. JEOR: SCORE: 2151.78

## 2021-10-26 NOTE — PROGRESS NOTES
Henok Waldron  /[birth year]   Pulse 102   Ht 1.829 m (6')   Wt 123.4 kg (272 lb)   SpO2 99%   BMI 36.89 kg/m       Assessment/Plan:                Henok was seen today for physical, diabetes and medication question.    Diagnoses and all orders for this visit:    Routine history and physical examination of adult  -     Lipid panel reflex to direct LDL Fasting  -     Hepatitis C antibody; Future  -     HIV Antigen Antibody Combo; Future    Type 2 diabetes mellitus without complication, without long-term current use of insulin (H)  -     Basic metabolic panel  (Ca, Cl, CO2, Creat, Gluc, K, Na, BUN)  -     Hemoglobin A1c  -     Cancel: Albumin Random Urine Quantitative with Creat Ratio  -     metFORMIN (GLUCOPHAGE) 500 MG tablet; Take 1 tablet (500 mg) by mouth 2 times daily (with meals)  -     atorvastatin (LIPITOR) 20 MG tablet; Take 1 tablet (20 mg) by mouth daily    Dyslipidemia  -     atorvastatin (LIPITOR) 20 MG tablet; Take 1 tablet (20 mg) by mouth daily         DISCUSSION  See discussion below.  Increase Metformin to 500 twice daily, add atorvastatin 20 mg daily.  Check additional labs as noted.  Little bit  Subjective:     HPI:    Henok Waldron is a 46 year old male he is diagnosed of diabetes in the summer 2019.  He was initiated on Metformin.  He had 1 follow-up visit in November 2019 for his A1c had improved to 6.6 on Metformin 200 mg twice daily.  At that time it was elected to defer additional medications for risk reduction.  He attended diabetic education.  He was checking blood sugars and noting that they were quite favorable.  He had had favorable weight loss.  He has not had follow-up since, it has been nearly 2 years.    He reports that he continue to work diligently and had further weight reduction from his last visit up until about 8 to 10 months ago when his weight began to increase as he was not paying his closer attention to his dilator getting as much exercise.  He still is check blood  sugars and remained on the Metformin at the current dose.  His A1c today is 6.9, representing a slight increase.  He is reporting some morning readings as high as 150.  He denies significant symptoms of hyperglycemia.  He had a recent eye exam in February 2021 without retinopathy.  He denies neuropathy symptoms of foot exam was performed today.  No history of kidney disease.  Unable to obtain microalbumin sample but will in the future.  Blood pressure is ideal.  Discussed benefits of efforts to further reduce vascular disease risk by initiating statin and considering aspirin.  Will initiate statin therapy first after discussion.  We also discussed the benefits of increasing his Metformin dose.  Discussed the importance of more routine follow-up and continued blood sugar checking.    Discussed routine health prevention.  He is not interested in a flu shot or pneumonia vaccine today.  He is up-to-date with a Covid vaccine and tetanus vaccine.  Discussed that at some point in the future we will consider colon cancer screening based on evolving recommendations to begin screening at age is earlier than age 50.  Discussed deferral of prostate cancer screening until age 50 where we will contemplate further.  Discussed other routine labs including checking HIV and hepatitis C serologies as recommended.  Discussed routine dental care.    ROS:  Complete review of systems is obtained.  Other than the specific considerations noted above complete review of systems is negative.      Objective:   Medications:  Current Outpatient Medications   Medication     Ascorbic Acid (VITAMIN C) 100 MG CHEW     atorvastatin (LIPITOR) 20 MG tablet     blood glucose test strips     metFORMIN (GLUCOPHAGE) 500 MG tablet     Vitamin D, Cholecalciferol, 25 MCG (1000 UT) TABS     No current facility-administered medications for this visit.        Allergies:     Allergies   Allergen Reactions     Penicillins Unknown        Social History      Socioeconomic History     Marital status:      Spouse name: Not on file     Number of children: Not on file     Years of education: Not on file     Highest education level: Not on file   Occupational History     Not on file   Tobacco Use     Smoking status: Never Smoker     Smokeless tobacco: Former User   Substance and Sexual Activity     Alcohol use: Not on file     Drug use: Not on file     Sexual activity: Not on file   Other Topics Concern     Not on file   Social History Narrative     Not on file     Social Determinants of Health     Financial Resource Strain:      Difficulty of Paying Living Expenses:    Food Insecurity:      Worried About Running Out of Food in the Last Year:      Ran Out of Food in the Last Year:    Transportation Needs:      Lack of Transportation (Medical):      Lack of Transportation (Non-Medical):    Physical Activity:      Days of Exercise per Week:      Minutes of Exercise per Session:    Stress:      Feeling of Stress :    Social Connections:      Frequency of Communication with Friends and Family:      Frequency of Social Gatherings with Friends and Family:      Attends [employer] Services:      Active Member of Clubs or Organizations:      Attends Club or Organization Meetings:      Marital Status:    Intimate Partner Violence:      Fear of Current or Ex-Partner:      Emotionally Abused:      Physically Abused:      Sexually Abused:        No family history on file.     Most Recent Immunizations   Administered Date(s) Administered     COVID-19,PF,Pfizer 04/21/2021     Tdap (Adacel,Boostrix) 05/28/2019        Wt Readings from Last 3 Encounters:   10/26/21 123.4 kg (272 lb)   11/15/19 116.6 kg (257 lb)   07/16/19 126.2 kg (278 lb 3.2 oz)        BP Readings from Last 6 Encounters:   10/26/21 128/76   11/15/19 122/72        Hemoglobin A1C   Date Value Ref Range Status   10/26/2021 6.9 (H) 0.0 - 5.6 % Final     Comment:     Normal <5.7%   Prediabetes 5.7-6.4%    Diabetes 6.5%  or higher     Note: Adopted from ADA consensus guidelines.   11/15/2019 6.6 (H) 3.5 - 6.0 % Final   06/14/2019 10.3 (H) 3.5 - 6.0 % Final        Lab Results   Component Value Date    CHOL 198 11/15/2019     Lab Results   Component Value Date    HDL 37 11/15/2019     Lab Results   Component Value Date     11/15/2019     Lab Results   Component Value Date    TRIG 139 11/15/2019     PHYSICAL EXAM:    /76   Pulse 102   Ht 1.829 m (6')   Wt 123.4 kg (272 lb)   SpO2 99%   BMI 36.89 kg/m       General Appearance:    Alert, cooperative, no distress   Eyes:   No scleral icterus or conjunctival irritation       Ears:    Normal TM's and external ear canals, both ears   Throat:   Lips, mucosa, and tongue normal; teeth and gums normal   Neck:   Supple, symmetrical, trachea midline, no adenopathy;        thyroid:  No enlargement/tenderness/nodules   Lungs:     Clear to auscultation bilaterally, respirations unlabored, no wheezesor crackles   Heart:    Regular rate and rhythm,  No murmur   Abdomen:    Soft, no distention, no tenderness on palpation, no masses, no organomegaly     Extremities:  No edema, no jointswelling or redness, no evidence of any injuries, palpable dorsalis pedis pulses, palpable posterior tibialis pulses.  No ulcerations.  No significant callus formation.  No other foot deformities.   Skin:  Noconcerning skin findings, no suspicious moles, no rashes   Neurologic:  On gross examination there is no motor or sensory deficit.  Specific examination of the feet using the monofilament line revealsthat there is no absence of sensation.  Patient walks with a normal gait                                                   Answers for HPI/ROS submitted by the patient on 10/26/2021  Frequency of exercise:: 6-7 days/week  Getting at least 3 servings of Calcium per day:: Yes  Diet:: Diabetic, Carbohydrate counting  Taking medications regularly:: Yes  Medication side effects:: None  Bi-annual eye exam::  Yes  Dental care twice a year:: NO  Sleep apnea or symptoms of sleep apnea:: None  Additional concerns today:: No  Duration of exercise:: 45-60 minutes

## 2021-12-16 ENCOUNTER — IMMUNIZATION (OUTPATIENT)
Dept: NURSING | Facility: CLINIC | Age: 46
End: 2021-12-16
Payer: COMMERCIAL

## 2021-12-16 PROCEDURE — 91300 PR COVID VAC PFIZER DIL RECON 30 MCG/0.3 ML IM: CPT

## 2021-12-16 PROCEDURE — 0001A PR COVID VAC PFIZER DIL RECON 30 MCG/0.3 ML IM: CPT

## 2022-05-22 ENCOUNTER — HEALTH MAINTENANCE LETTER (OUTPATIENT)
Age: 47
End: 2022-05-22

## 2022-09-25 ENCOUNTER — HEALTH MAINTENANCE LETTER (OUTPATIENT)
Age: 47
End: 2022-09-25

## 2023-02-04 ENCOUNTER — HEALTH MAINTENANCE LETTER (OUTPATIENT)
Age: 48
End: 2023-02-04

## 2023-02-17 DIAGNOSIS — E11.9 TYPE 2 DIABETES MELLITUS WITHOUT COMPLICATION, WITHOUT LONG-TERM CURRENT USE OF INSULIN (H): ICD-10-CM

## 2023-02-18 NOTE — TELEPHONE ENCOUNTER
"Routing refill request to provider for review/approval because:  Labs not current:  A1c, cr  Due to be seen    Last Written Prescription Date:  11/3/22  Last Fill Quantity: 180,  # refills: 0   Last office visit provider:  10/26/21     Requested Prescriptions   Pending Prescriptions Disp Refills     metFORMIN (GLUCOPHAGE) 500 MG tablet [Pharmacy Med Name: metFORMIN HCl 500 MG Oral Tablet] 180 tablet 0     Sig: TAKE 1 TABLET BY MOUTH TWICE DAILY WITH MEALS       Biguanide Agents Failed - 2/17/2023  9:22 AM        Failed - Patient has documented A1c within the specified period of time.     If HgbA1C is 8 or greater, it needs to be on file within the past 3 months.  If less than 8, must be on file within the past 6 months.     Recent Labs   Lab Test 10/26/21  0951   A1C 6.9*             Failed - Patient's CR is NOT>1.4 OR Patient's EGFR is NOT<45 within past 12 mos.     Recent Labs   Lab Test 10/26/21  0951 11/15/19  1030   GFRESTIMATED 68 >60   GFRESTBLACK  --  >60       Recent Labs   Lab Test 10/26/21  0951   CR 1.26             Failed - Recent (6 mo) or future (30 days) visit within the authorizing provider's specialty     Patient had office visit in the last 6 months or has a visit in the next 30 days with authorizing provider or within the authorizing provider's specialty.  See \"Patient Info\" tab in inbasket, or \"Choose Columns\" in Meds & Orders section of the refill encounter.            Passed - Patient is age 10 or older        Passed - Patient does NOT have a diagnosis of CHF.        Passed - Medication is active on med Vale Art, RN 02/18/23 2:21 PM  "

## 2023-07-03 DIAGNOSIS — E11.9 TYPE 2 DIABETES MELLITUS WITHOUT COMPLICATION, WITHOUT LONG-TERM CURRENT USE OF INSULIN (H): ICD-10-CM

## 2023-08-05 ENCOUNTER — HEALTH MAINTENANCE LETTER (OUTPATIENT)
Age: 48
End: 2023-08-05

## 2023-10-09 DIAGNOSIS — E11.9 TYPE 2 DIABETES MELLITUS WITHOUT COMPLICATION, WITHOUT LONG-TERM CURRENT USE OF INSULIN (H): ICD-10-CM

## 2024-01-20 DIAGNOSIS — E11.9 TYPE 2 DIABETES MELLITUS WITHOUT COMPLICATION, WITHOUT LONG-TERM CURRENT USE OF INSULIN (H): ICD-10-CM

## 2024-03-02 ENCOUNTER — HEALTH MAINTENANCE LETTER (OUTPATIENT)
Age: 49
End: 2024-03-02

## 2024-03-15 DIAGNOSIS — E11.9 TYPE 2 DIABETES MELLITUS WITHOUT COMPLICATION, WITHOUT LONG-TERM CURRENT USE OF INSULIN (H): ICD-10-CM

## 2024-09-28 ENCOUNTER — HEALTH MAINTENANCE LETTER (OUTPATIENT)
Age: 49
End: 2024-09-28